# Patient Record
Sex: MALE | Race: WHITE
[De-identification: names, ages, dates, MRNs, and addresses within clinical notes are randomized per-mention and may not be internally consistent; named-entity substitution may affect disease eponyms.]

---

## 2017-01-12 NOTE — P.CRDCN
History of Present Illness


Consult date: 01/12/17


Chief complaint: Chest pain


History of present illness: 





This is a pleasant 51-year-old gentleman with a past medical history 

significant for obesity and no other comorbidities like diabetes or 

hypertension or dyslipidemia resented to the emergency room complaining of 

chest discomfort.


He was experiencing cold symptoms over the last few days where he was having 

some sore throat and cough.  Yesterday he developed the chest discomfort as 

sharp kind of discomfort with radiation to the back and without any associated 

symptoms.


The EKG showed sinus rhythm without any significant ST or T-wave abnormalities.


He had only one set of cardiac enzyme came in to be unremarkable.


The patient is not aware of any prior cardiac history and never seen a 

cardiologist in the past.


He is a smoker but he quit smoking about 2 weeks ago.











Past Medical History


Past Medical History: Renal Disease, Sleep Apnea/CPAP/BIPAP


Additional Past Medical History / Comment(s): 2012 Bell's palsey, 

nephrolithiasis, PETRA with CPAP use, had EGD 1/11/1 and may have gastritis.


History of Any Multi-Drug Resistant Organisms: None Reported


Past Surgical History: No Surgical Hx Reported


Additional Past Surgical History / Comment(s): 1/11/17  EGD with bx.  Other 

surgical Hx:  colonoscopy-normal, several lithotripsies, vasectomy.


Past Anesthesia/Blood Transfusion Reactions: No Reported Reaction, Motion 

Sickness


Past Psychological History: No Psychological Hx Reported


Additional Psychological History / Comment(s): Pt resides with his spouse and 2 

sons one of which is under the age of 18yrs.  He is independent.


Smoking Status: Former smoker


Past Alcohol Use History: Occasional


Additional Past Alcohol Use History / Comment(s): Pt states he started smoking 

in 1984 and was a less than a ppd smoker.  He states he quit smoking 1-2 weeks 

ago.  He drinks alcohol occasionally.


Past Drug Use History: Marijuana


Additional Drug Use History / Comment(s): Pt states he smokes marijuana 

occasionally.





- Past Family History


  ** Father


Additional Family Medical History / Comment(s): Pt states his father has about 

20% heart function.  He is in his late 70's and his heart problems stared about 

20 yrs ago.





  ** Mother


Family Medical History: Diabetes Mellitus





Medications and Allergies


 Home Medications











 Medication  Instructions  Recorded  Confirmed  Type


 


No Known Home Medications [No  01/12/17 01/12/17 History





Known Home Medications]    











 Allergies











Allergy/AdvReac Type Severity Reaction Status Date / Time


 


No Known Allergies Allergy   Verified 01/12/17 07:03














Physical Exam


Vitals: 


 Vital Signs











  Temp Pulse Pulse Resp BP BP Pulse Ox


 


 01/12/17 07:30  97.4 F L   63  18   139/79  98


 


 01/12/17 07:06  97.1 F L  64   16  119/72   98








 Intake and Output











 01/11/17 01/12/17 01/12/17





 22:59 06:59 14:59


 


Other:   


 


  Weight   143.9 kg








 Patient Weight











 01/13/17





 06:59


 


Weight 143.9 kg














- Constitutional


General appearance: no acute distress





- Respiratory


Respiratory: bilateral: CTA





- Cardiovascular


Rhythm: regular


Heart sounds: normal: S1, S2





Results





 01/12/17 04:55





 01/12/17 04:55


 Current Medications











Generic Name Dose Route Start Last Admin





  Trade Name Freq  PRN Reason Stop Dose Admin


 


Aspirin  325 mg  01/13/17 09:00  





  Aspirin  PO   





  DAILY [facility]   


 


Heparin Sodium (Porcine)  0 unit  01/12/17 06:38  





  Heparin  IV   





  Q6HR PRN   





  Low PTT   





  Protocol   


 


Heparin Sodium/Dextrose 25,000  500 mls @ 20.13 mls/hr  01/12/17 06:45  01/12/ 17 07:04





  unit/ IV Solution  IV   7.34 units/kg/hr





  .Q24H MADHURI   20 mls/hr





  Protocol   Administration





  7.4 UNITS/KG/HR   


 


Nitroglycerin  1 inch  01/12/17 12:00  





  Nitro-Bid Oint  TOPICAL   





  Q6HR [facility]   


 


Nitroglycerin  0.4 mg  01/12/17 06:38  





  Nitrostat  SUBLINGUAL   





  Q5M PRN   





  Chest Pain   


 


Sodium Chloride  10 ml  01/12/17 09:00  





  Saline Flush  IV   





  BID MADHURI   








 Intake and Output











 01/11/17 01/12/17 01/12/17





 22:59 06:59 14:59


 


Other:   


 


  Weight   143.9 kg








 Patient Weight











 01/13/17





 06:59


 


Weight 143.9 kg














Assessment and Plan


Plan: 





Assessment


#1 atypical chest discomfort


#2 significant history of smoking





Plan


#1 waiting for the second set of serial cardiac enzymes


#2 an echocardiogram was performed and I'll follow-up with that


#3 further recommendation to follow the

## 2017-01-12 NOTE — XR
EXAMINATION TYPE: XR chest 2V

 

DATE OF EXAM: 1/12/2017 5:01 AM

 

COMPARISON: 8/31/2016

 

HISTORY: Chest pain

 

TECHNIQUE:  Frontal and lateral views of the chest are obtained.

 

FINDINGS:  Heart and mediastinum are normal. Lungs are clear. Diaphragm is normal. There are chest le
ads. Bony thorax is intact.

 

IMPRESSION:  Normal chest. No change.

## 2017-01-12 NOTE — ECHOF
Referral Reason:acs



MEASUREMENTS

--------

HEIGHT: 182.9 cm

WEIGHT: 136.1 kg

BP: 128/71

RVIDd:   3.7 cm     (< 3.3)

IVSd:   1.6 cm     (0.6 - 1.1)

LVIDd:   4.2 cm     (3.9 - 5.3)

LVPWd:   1.4 cm     (0.6 - 1.1)

IVSs:   2.2 cm

LVIDs:   2.8 cm

LVPWs:   1.9 cm

LA Diam:   3.7 cm     (2.7 - 3.8)

Ao Diam:   2.9 cm     (2.0 - 3.7)

AV Cusp:   2.3 cm     (1.5 - 2.6)

LA Diam:   3.5 cm     (2.7 - 3.8)

MV EXCURSION:   13.189 mm     (> 18.000)

MV EF SLOPE:   55 mm/s     (70 - 150)

EPSS:   1.4 cm

MV E Rogers:   0.94 m/s

MV DecT:   224 ms

MV A Rogers:   0.67 m/s

MV E/A Ratio:   1.42 

RAP:   5.00 mmHg

RVSP:   34.31 mmHg







FINDINGS

--------

Sinus rhythm.

This was a technically adequate study.

The left ventricular size is normal.   There is 

moderate concentric left ventricular hypertrophy.   

Overall left ventricular systolic function is normal 

with, an EF between 60 - 65 %.

The right ventricle is mildly enlarged.

The left atrial size is normal.

The right atrium is normal in size.

1.5mg of Definity was utilized for enhancement of images

The aortic valve is trileaflet and appears structurally 

normal.

Mild mitral annular calcification present.

Mild tricuspid regurgitation present.   Right 

ventricular systolic pressure is normal at < 35 mmHg.

Pulmonic valve appears structurally normal.

The aortic root size is normal.

The inferior vena cava is mildly dilated.

There is no pericardial effusion.



CONCLUSIONS

--------

1. Sinus rhythm.

2. Mild mitral annular calcification present.

3. Mild tricuspid regurgitation present.

4. Right ventricular systolic pressure is normal at < 35 

mmHg.

5. Pulmonic valve appears structurally normal.

6. The aortic root size is normal.

7. The inferior vena cava is mildly dilated.

8. There is no pericardial effusion.

9. This was a technically adequate study.

10. The left ventricular size is normal.

11. There is moderate concentric left ventricular 

hypertrophy.

12. Overall left ventricular systolic function is normal 

with, an EF between 60 - 65 %.

13. The right ventricle is mildly enlarged.

14. The left atrial size is normal.

15. 1.5mg of Definity was utilized for enhancement of images

16. The aortic valve is trileaflet and appears structurally 

normal.





SONOGRAPHER: Juan Krishnamurthy RDCS

## 2017-01-12 NOTE — P.HPIM
History of Present Illness


H&P Date: 01/12/17


Chief Complaint: Chest pain





This is a 51-year-old  male patient of Dr. Gee Mckenzie with past 

medical history for kidney stones, obstructive sleep apnea, tobacco use and 

dependence, marijuana use.  He gives history that he has had pains in the left 

upper chest for the past 1-1-1/2 months.  He thought it was due to a cold with 

cold symptoms but his left chest was sore.  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  Then the pain radiated to the 

shoulder blade on the same side.  He states that this morning he was going to 

bed and over entire issues and the pain started again and his wife made him 

come into the hospital.  Patient also gives history of having stomach problems 

since last summer and was initially sent to Dr. Orourke underwent a colonoscopy 

and then underwent an EGD yesterday with Dr. Cuenca at the [facility].  He states he left the procedure and went home and didn't feel good.  

He states he then had the ache in his left chest and shoulder.  He also voices 

that he's had some pain in his neck and jaw area and shooting pain in his left 

arm.  He denies any tenderness to the area.  He came into [facility] emergency center for evaluation.  Troponin negative on 2 draws so far.

  EKG showed a sinus rhythm without ST-T wave changes. He was started on 

heparin drip and placed on the observation unit.  Echocardiogram reveals mild 

tricuspid regurgitation, moderate concentric left ventricular hypertrophy, EF 60

-65%. 





Review of Systems


All systems: negative


Constitutional: Denies chills, Denies fever


Eyes: denies blurred vision, denies pain


Ears, nose, mouth and throat: Denies headache, Denies sore throat


Cardiovascular: Reports chest pain, Denies shortness of breath


Respiratory: Denies cough


Gastrointestinal: Denies abdominal pain, Denies diarrhea, Denies nausea, Denies 

vomiting


Musculoskeletal: Denies myalgias


Integumentary: Denies pruritus, Denies rash


Neurological: Denies numbness, Denies weakness


Psychiatric: Denies anxiety, Denies depression


Endocrine: Denies fatigue, Denies weight change





Past Medical History


Past Medical History: Renal Disease, Sleep Apnea/CPAP/BIPAP


Additional Past Medical History / Comment(s): 2012 Bell's palsey, 

nephrolithiasis, PETRA with CPAP use, had EGD 1/11/1 and may have gastritis.


History of Any Multi-Drug Resistant Organisms: None Reported


Past Surgical History: No Surgical Hx Reported


Additional Past Surgical History / Comment(s): 1/11/17  EGD with bx.  Other 

surgical Hx:  colonoscopy-normal, several lithotripsies, vasectomy.


Past Anesthesia/Blood Transfusion Reactions: No Reported Reaction, Motion 

Sickness


Past Psychological History: No Psychological Hx Reported


Additional Psychological History / Comment(s): Pt resides with his spouse and 2 

sons one of which is under the age of 18yrs.  He is independent.


Smoking Status: Former smoker


Past Alcohol Use History: Occasional


Additional Past Alcohol Use History / Comment(s): Pt states he started smoking 

in 1984 and was a less than a ppd smoker.  He states he quit smoking 1-2 weeks 

ago.  He drinks alcohol occasionally.


Past Drug Use History: Marijuana


Additional Drug Use History / Comment(s): Pt states he smokes marijuana 

occasionally.





- Past Family History


  ** Father


Additional Family Medical History / Comment(s): Pt states his father has about 

20% heart function.  He is in his late 70's and his heart problems stared about 

20 yrs ago.





  ** Mother


Family Medical History: Diabetes Mellitus


Additional Family Medical History / Comment(s): Mother is alive at age 70 with 

history of diabetes.





  ** Brother(s)


Additional Family Medical History / Comment(s): Patient has 2 brothers and 2 

sisters with no major medical problems.





  ** Son(s)


Additional Family Medical History / Comment(s): Patient has 2 sons and 1 

recently diagnosed with chiari malformation.  He does not have any daughters.





Medications and Allergies


 Home Medications











 Medication  Instructions  Recorded  Confirmed  Type


 


No Known Home Medications [No  01/12/17 01/12/17 History





Known Home Medications]    











 Allergies











Allergy/AdvReac Type Severity Reaction Status Date / Time


 


No Known Allergies Allergy   Verified 01/12/17 07:03














Physical Exam


Vitals: 


 Vital Signs











  Temp Pulse Pulse Resp BP BP Pulse Ox


 


 01/12/17 07:30  97.4 F L   63  18   139/79  98


 


 01/12/17 07:06  97.1 F L  64   16  119/72   98








 Intake and Output











 01/11/17 01/12/17 01/12/17





 22:59 06:59 14:59


 


Other:   


 


  Weight   143.9 kg








 Patient Weight











 01/13/17





 06:59


 


Weight 143.9 kg

















Gen: This is a 51-year-old  male.  Patient appears to be in no acute 

distress.


HEENT: Head is atraumatic, normocephalic. Pupils equal, round. Sclerae is 

anicteric. 


NECK: Supple. No JVD. No lymphadenopathy. No thyromegaly. 


LUNGS: Clear to auscultation. No wheezes or rhonchi.  No intercostal 

retractions.


HEART: Regular rate and rhythm. No murmur. 


ABDOMEN: Soft. Bowel sounds are present. No masses.  No tenderness.


EXTREMITIES: No pedal edema.  No calf tenderness.


NEUROLOGICAL: Patient is awake, alert and oriented x3. Cranial nerves 2 through 

12 are grossly intact. 








Results


CBC & Chem 7: 


 01/12/17 04:55





 01/12/17 04:55





Thrombosis Risk Factor Assmnt





- DVT/VTE Prophylaxis


DVT/VTE Prophylaxis: Pharmacologic Prophylaxis ordered





- Choose All That Apply


Any of the Below Risk Factors Present?: Yes


Each Factor Represents 1 point: Age 41-60 years, Obesity (BMI >25)


Other Risk Factors: No


Other congenital or acquired thrombophilia - If yes, enter type in comment: No


Thrombosis Risk Factor Assessment Total Risk Factor Score: 2


Thrombosis Risk Factor Assessment Level: Low Risk





Assessment and Plan


Plan: 





1.  Chest pain, possibly musculoskeletal.  Awaiting third set of cardiac 

enzymes.  Echocardiogram as above.  Patient has been seen by cardiologist and 

is on a heparin drip.





2.  Obstructive sleep apnea on CPAP.  Continue with patient stays overnight.





3.  History of kidney stones, stable.





4.  Tobacco use and dependence.  Smoking cessation.





5.  Regular marijuana use.








Patient placed on the observation unit.


Discharge plan: Return home


Impression and plan of care have been directed as dictated by the signing 

physician.  Sharon Gavin nurse practitioner acting as scribe for signing 

physician.

## 2017-01-13 NOTE — P.PN
Subjective


Principal diagnosis: 





Atypical chest discomfort





This is a pleasant 51-year-old gentleman with a past medical history 

significant for obesity and no other comorbidities like diabetes or 

hypertension or dyslipidemia resented to the emergency room complaining of 

chest discomfort.


He was experiencing cold symptoms over the last few days where he was having 

some sore throat and cough.  Yesterday he developed the chest discomfort as 

sharp kind of discomfort with radiation to the back and without any associated 

symptoms.


The EKG showed sinus rhythm without any significant ST or T-wave abnormalities.


He had only one set of cardiac enzyme came in to be unremarkable.


The patient is not aware of any prior cardiac history and never seen a 

cardiologist in the past.


He is a smoker but he quit smoking about 2 weeks ago.





The patient is going to have a stress test. 





Objective





- Vital Signs


Vital signs: 


 Vital Signs











Temp  97.8 F   01/13/17 07:38


 


Pulse  60   01/13/17 07:38


 


Resp  18   01/13/17 07:38


 


BP  137/88   01/13/17 07:38


 


Pulse Ox  96   01/13/17 07:38








 Intake & Output











 01/12/17 01/13/17 01/13/17





 18:59 06:59 18:59


 


Intake Total  1000 


 


Balance  1000 


 


Weight 143.9 kg  


 


Intake:   


 


  Oral  1000 


 


Other:   


 


  Voiding Method  Toilet 


 


  # Voids  1 














- Labs


CBC & Chem 7: 


 01/13/17 06:18





 01/12/17 04:55


Labs: 


 Abnormal Lab Results - Last 24 Hours (Table)











  01/12/17 01/12/17 01/13/17 Range/Units





  12:01 17:53 06:18 


 


Total Creatine Kinase  43 L  41 L   ()  U/L


 


LDL Cholesterol, Calc    143 H  (0-99)  mg/dL

## 2017-01-17 NOTE — P.DS
Providers


Date of admission: 


01/12/17 06:38





Expected date of discharge: 01/13/17


Attending physician: 


Cecille Galarza





Primary care physician: 


Gee Mckenzie





[facility] Course: 





This is a 51-year-old  male patient of Dr. Gee Mckenzie with past 

medical history for kidney stones, obstructive sleep apnea, tobacco use and 

dependence, marijuana use.  He gives history that he has had pains in the left 

upper chest for the past 1-1-1/2 months.  He thought it was due to a cold with 

cold symptoms but his left chest was sore.  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  Then the pain radiated to the 

shoulder blade on the same side.  He states that this morning he was going to 

bed and over entire issues and the pain started again and his wife made him 

come into the hospital.  Patient also gives history of having stomach problems 

since last summer and was initially sent to Dr. Orourke underwent a colonoscopy 

and then underwent an EGD yesterday with Dr. Cuenca at the [facility].  He states he left the procedure and went home and didn't feel good.  

He states he then had the ache in his left chest and shoulder.  He also voices 

that he's had some pain in his neck and jaw area and shooting pain in his left 

arm.  He denies any tenderness to the area.  He came into [facility] emergency center for evaluation.  Troponin negative on 2 draws so far.

  EKG showed a sinus rhythm without ST-T wave changes. He was started on 

heparin drip and placed on the observation unit.  Echocardiogram reveals mild 

tricuspid regurgitation, moderate concentric left ventricular hypertrophy, EF 60

-65%. 





1/13: Patient underwent stress echocardiogram which was normal.  He was cleared 

by cardiology for discharge home.  Patient was discharged home today in stable 

condition.





Discharge diagnoses:


1.  Chest pain, possibly musculoskeletal.  


2.  Obstructive sleep apnea on CPAP.  


3.  History of kidney stones, stable.


4.  Tobacco use and dependence.  


5.  Regular marijuana use.





Patient placed on the observation unit.


Discharge plan: Return home


Impression and plan of care have been directed as dictated by the signing 

physician.  Sharon Gavin nurse practitioner acting as scribe for signing 

physician.





Cc: Dr. Gee Mckenzie





Patient Condition at Discharge: Good





Plan - Discharge Summary


New Discharge Prescriptions: 


Baclofen 10 mg PO BID #60 tab


Omeprazole 40 mg PO DAILY #30 capsule.


Discharge Medication List





Baclofen 10 mg PO BID #60 tab 01/13/17 [Rx]


Ibuprofen [Motrin] 600 mg PO QID PRN #0 tab 01/13/17 [Rx]


Omeprazole 40 mg PO DAILY #30 capsule. 01/13/17 [Rx]








Follow up Appointment(s)/Referral(s): 


Davis Gerber MD [STAFF PHYSICIAN] - 4 Weeks


Gee Mckenzie DO [Primary Care Provider] - 1-2 days


Patient Instructions/Handouts:  Chest Pain (GEN)


Activity/Diet/Wound Care/Special Instructions: 


low fat/low salt diet


activity as tolerated


Discharge Disposition: HOME SELF-CARE

## 2017-01-19 NOTE — CONS
DATE OF CONSULTATION:  01/19/2017



CONSULTATION/NEW PATIENT EVALUATION



A 51-year-old gentleman who has been re-evaluated in the sleep center 

for obstructive sleep apnea-hypopnea syndrome.  



HISTORY OF PRESENT ILLNESS/SLEEP-WAKE EVALUATION:   Patient had been 

diagnosed with obstructive sleep apnea in 2013 was started on 

treatment with CPAP at the pressure of 12 cm of water. I saw him for 

follow-up visit last time in November 2013. At that time, patient 

demonstrated 100% compliance with treatment. His weight at that time 

was 340 pounds. He lost weight down to 320 pounds over the last 3 

years.  



Patient continued to use his CPAP equipment every night for the whole 

night with the same pressure. No snoring with the machine. He likes 

equipment, does not have any problems with that. He maybe wakes up 

from sleep once. No episodes of nocturia.  



SLEEP SCHEDULE:  His usual sleep schedule from 7 p.m. to 3:40 a.m. and 

that is basically 7 days a week.  



FALLING ASLEEP:  No problem with falling asleep. He has a TV set in 

bedroom.  



DURING SLEEP:  He usually sleeps on the back position. 



DURING THE DAY/WAKE STATE:  [place] Sleepiness Scale is 0. No 

sleepiness than during the day at all.  



PAST MEDICAL HISTORY:  Basically none. 



MEDICATIONS:  None at the present time. 



SOCIAL HISTORY: Positive for smoking for about 30 pack-years; quit 2 

weeks ago. Alcohol consumption occasional.  



FAMILY HISTORY: Hypertension, snoring. 



REVIEW OF SYSTEMS: Basically sometimes restriction of nasal breathing. 

No fevers. No double vision. No recent chest pain. No shortness of 

breath. No abdominal pain. No bleeding episodes. No blood in urine. No 

seizure episodes.  



PHYSICAL EXAMINATION: 

GENERAL: During physical exam, a 51-year-old  gentleman 

without distress.  

VITAL SIGNS: /86, HR 64, RR 16. Height (      ), weight 320, BMI 

43.3. Neck 18-1/4 inches in circumference. Temperature 97.1. Oxygen 

saturation at room air 96%.  

HEENT: PERRLA, EOMI. Evaluation of oropharynx extremely low position 

of soft palate. Restriction of nasal breathing, more on the right 

side.  

NECK: Supple. No JVD. Thyroid is not palpable. 

LUNGS: Clear to percussion and to auscultation. Good air exchange. No 

wheezing or rhonchi.  

HEART: S1, S2 regular. No murmurs, gallops or rubs. 

ABDOMEN: Soft and nontender. Bowel sounds are present. No organomegaly 

appreciated.  

EXTREMITIES: No clubbing or cyanosis. 

CNS: Awake, alert, and oriented x3. Cranial nerves 2 to 7 intact. 

There is no fasciculation or atrophy noted. No focal deficits 

observed.  



IMPRESSION: 

1. Severe obstructive sleep apnea-hypopnea syndrome, clinically on 

control with CPAP at 12 cm of water. No snoring with CPAP.  No daytime 

sleepiness.  

2. Obesity. Patient lost 20 pounds since previous titration in 2013. 

3. History of smoking for 30 pack-years; quit 2 weeks ago. 

4. Restriction of nasal breathing, significant more on the right side. 

According to patient when he used the machine, he started to breathe 

through the nose better.  



PLAN: 

1. Prescription for all necessary CPAP supplies, including new mask, 

tube, filters.  

2. We should get results of reading from the machine in the 

relationship [facility] reading to be sure this is normal.  

3. Continue losing weight. 

4. Sleep hygiene with regular time in bed for at least 8 hours. 

5. No driving if feeling any sleepiness. Presently patient does not 

complain of any sleepiness.  



Thank you very much for allowing me to participate in the management 

of your patient.  



Sincerely,







Arsenio Hurley MD, PhD, FAASM.

Diplomat of American Board of Sleep Medicine,

Sleep Medicine Board by American Board of Medical Specialities

American Board of Internal Medicine

Medical Director of [facility] [place]

## 2017-06-29 NOTE — XR
EXAMINATION TYPE: XR abdomen 2V

 

DATE OF EXAM: 6/29/2017

 

CLINICAL DATA:  51 year-old male chronic abdominal and back pain, PHH

 

COMPARISON:  None

 

FINDINGS:

 

Lung bases are clear. 

 

No evidence for free intraperitoneal air. 

 

No dilated small bowel or air-fluid levels. Scattered air and stool seen throughout the colon extendi
ng distally into the rectum. There is mild overall stool burden. 

 

No suspicious calcifications identified. Some phleboliths in the pelvis.

 

IMPRESSION:

 

No evidence of bowel obstruction or free intraperitoneal air.

## 2017-06-29 NOTE — ED
General Adult HPI





- General


Chief complaint: Back Pain/Injury


Stated complaint: back pain


Time Seen by Provider: 06/29/17 09:08


Source: patient, RN notes reviewed


Mode of arrival: ambulatory


Limitations: no limitations





- History of Present Illness


Initial comments: 





51-year-old male presents to the emergency Department chief complaint of 

chronic abdominal pain and back pain.  Patient states that over the last year 

he has developed this chronic abdominal pain and back pain.  Patient states 

that he was seen his doctor who presents to the ER and they've never found a 

cause.  Patient states he just constantly feels bloated.  Patient states he's 

had diarrhea due to the fact he's been drinking prune juice to help with the 

bloated feeling.  Patient states that it flares up his back when it flares up 

his abdomen.  Patient denies any nausea or vomiting.  Patient states that he 

does not know what is causing this and he is having a hard at home.  Patient 

states he was placed on Pepcid and that did not help.  Patient states he has 

been eating healthy and is unable to lose weight.  Patient states he was 

concerned due to his symptoms were thought that he should be seen. Patient 

denies any recent fever, chills, shortness of breath, chest pain, nausea 

vomiting, numbness or tingling, dysuria or hematuria, constipation, headaches 

or visual changes, or any other current symptoms.





- Related Data


 Previous Rx's











 Medication  Instructions  Recorded


 


Dicyclomine [Bentyl] 10 mg PO TID #20 capsule 06/29/17











 Allergies











Allergy/AdvReac Type Severity Reaction Status Date / Time


 


No Known Allergies Allergy   Verified 06/29/17 09:41














Review of Systems


ROS Statement: 


Those systems with pertinent positive or pertinent negative responses have been 

documented in the HPI.





ROS Other: All systems not noted in ROS Statement are negative.





Past Medical History


Past Medical History: Renal Disease, Sleep Apnea/CPAP/BIPAP


Additional Past Medical History / Comment(s): 2012 Bell's palsey, 

nephrolithiasis, PETRA with CPAP use, had EGD 1/11/1 and may have gastritis.


History of Any Multi-Drug Resistant Organisms: None Reported


Past Surgical History: No Surgical Hx Reported


Additional Past Surgical History / Comment(s): 1/11/17  EGD with bx.  Other 

surgical Hx:  colonoscopy-normal, several lithotripsies, vasectomy.


Past Anesthesia/Blood Transfusion Reactions: No Reported Reaction, Motion 

Sickness


Past Psychological History: No Psychological Hx Reported


Smoking Status: Current every day smoker


Past Alcohol Use History: Occasional


Past Drug Use History: Marijuana





- Past Family History


  ** Father


Additional Family Medical History / Comment(s): Pt states his father has about 

20% heart function.  He is in his late 70's and his heart problems stared about 

20 yrs ago.





  ** Mother


Family Medical History: Diabetes Mellitus


Additional Family Medical History / Comment(s): Mother is alive at age 70 with 

history of diabetes.





  ** Brother(s)


Additional Family Medical History / Comment(s): Patient has 2 brothers and 2 

sisters with no major medical problems.





  ** Son(s)


Additional Family Medical History / Comment(s): Patient has 2 sons and 1 

recently diagnosed with chiari malformation.  He does not have any daughters.





General Exam





- General Exam Comments


Initial Comments: 





General:  The patient is awake and alert, in no distress, and does not appear 

acutely ill. 


Eye:  Pupils are equal, round and reactive to light, extra-ocular movements are 

intact; there is normal conjunctiva bilaterally.  No signs of icterus.  


Ears, nose, mouth and throat:  There are moist mucous membranes and no oral 

lesions. 


Neck:  The neck is supple, there is no tenderness.


Cardiovascular:  There is a regular rate and rhythm. No murmur, rub or gallop 

is appreciated.


Respiratory:  Lungs are clear to auscultation, respirations are non-labored, 

breath sounds are equal.  No wheezes, stridor, rales, or rhonchi.


Gastrointestinal:  Soft, non-distended, non-tender abdomen without masses or 

organomegaly noted. There is no rebound or guarding present.  No CVA 

tenderness. Bowel sounds are unremarkable.


Back:  There is no tenderness to palpation in the midline. There is no obvious 

deformity. No rashes noted. 


Musculoskeletal:  Normal ROM, no tenderness, There is no pedal edema. There is 

no calf tenderness or swelling. Sensation intact. Pulses equal bilaterally 2+.  


Neurological:  CN II-XII intact, There are no obvious motor or sensory 

deficits. Coordination appears grossly intact. Speech is normal.


Skin:  Skin is warm and dry and no rashes or lesions are noted. 


Psychiatric:  Cooperative, appropriate mood & affect, normal judgment.  





Limitations: no limitations





Course


 Vital Signs











  06/29/17 06/29/17





  09:03 09:39


 


Temperature 96.9 F L 


 


Pulse Rate 69 86


 


Respiratory 18 18





Rate  


 


Blood Pressure 176/116 143/89


 


O2 Sat by Pulse 98 99





Oximetry  














Medical Decision Making





- Medical Decision Making





51-year-old male presents emergency Department with a chief complaint of 

chronic abdominal pain.  This time lab work is reviewed and x-rays reviewed.  

This time patient's abdomen continues to be soft and nontender.  At this time 

we discussed close follow-up with GI and he is given information.  Discussed 

return parameters all patient's questions stated that he understood the plan.  

He will be discharged.





- Lab Data


Result diagrams: 


 06/29/17 09:21





 06/29/17 09:21


 Lab Results











  06/29/17 06/29/17 06/29/17 Range/Units





  09:21 09:21 09:26 


 


WBC   6.5   (3.8-10.6)  k/uL


 


RBC   4.78   (4.30-5.90)  m/uL


 


Hgb   15.3   (13.0-17.5)  gm/dL


 


Hct   44.2   (39.0-53.0)  %


 


MCV   92.5   (80.0-100.0)  fL


 


MCH   32.0   (25.0-35.0)  pg


 


MCHC   34.6   (31.0-37.0)  g/dL


 


RDW   13.3   (11.5-15.5)  %


 


Plt Count   198   (150-450)  k/uL


 


Neutrophils %   63   %


 


Lymphocytes %   30   %


 


Monocytes %   4   %


 


Eosinophils %   2   %


 


Basophils %   0   %


 


Neutrophils #   4.1   (1.3-7.7)  k/uL


 


Lymphocytes #   1.9   (1.0-4.8)  k/uL


 


Monocytes #   0.3   (0-1.0)  k/uL


 


Eosinophils #   0.1   (0-0.7)  k/uL


 


Basophils #   0.0   (0-0.2)  k/uL


 


Sodium  142    (137-145)  mmol/L


 


Potassium  4.2    (3.5-5.1)  mmol/L


 


Chloride  108 H    ()  mmol/L


 


Carbon Dioxide  22    (22-30)  mmol/L


 


Anion Gap  12    mmol/L


 


BUN  9    (9-20)  mg/dL


 


Creatinine  0.59 L    (0.66-1.25)  mg/dL


 


Est GFR (MDRD) Af Amer  >60    (>60 ml/min/1.73 sqM)  


 


Est GFR (MDRD) Non-Af  >60    (>60 ml/min/1.73 sqM)  


 


Glucose  100 H    (74-99)  mg/dL


 


Calcium  9.3    (8.4-10.2)  mg/dL


 


Total Bilirubin  0.8    (0.2-1.3)  mg/dL


 


AST  26    (17-59)  U/L


 


ALT  49    (21-72)  U/L


 


Alkaline Phosphatase  84    ()  U/L


 


Total Protein  7.7    (6.3-8.2)  g/dL


 


Albumin  4.4    (3.5-5.0)  g/dL


 


Amylase  37    ()  U/L


 


Lipase  30    ()  U/L


 


TSH  1.560    (0.465-4.680)  mIU/L


 


Free T4  1.38    (0.78-2.19)  ng/dL


 


Urine Color    Yellow  


 


Urine Appearance    Cloudy  (Clear)  


 


Urine pH    8.0  (5.0-8.0)  


 


Ur Specific Gravity    1.015  (1.001-1.035)  


 


Urine Protein    Trace H  (Negative)  


 


Urine Glucose (UA)    Negative  (Negative)  


 


Urine Ketones    Negative  (Negative)  


 


Urine Blood    Negative  (Negative)  


 


Urine Nitrite    Negative  (Negative)  


 


Urine Bilirubin    Negative  (Negative)  


 


Urine Urobilinogen    3.0  (<2.0)  mg/dL


 


Ur Leukocyte Esterase    Negative  (Negative)  


 


Urine WBC    <1  (0-5)  /hpf


 


Amorphous Sediment    Few H  (None)  /hpf


 


Urine Mucus    Rare H  (None)  /hpf














- Radiology Data


Radiology results: report reviewed, image reviewed





Disposition


Clinical Impression: 


 Chronic abdominal pain





Disposition: HOME SELF-CARE


Condition: Stable


Instructions:  Abdominal Pain (ED)


Additional Instructions: 


Please use medication as discussed. Please follow up with family doctor if 

symptoms have not improved over the next two days. Please return to the 

emergency room if your symptoms increase or worsen or for any other concerns. 


Prescriptions: 


Dicyclomine [Bentyl] 10 mg PO TID #20 capsule


Referrals: 


Gee Mckenzie DO [Primary Care Provider] - 1-2 days


Maria Teresa Spaulding MD [STAFF PHYSICIAN] - 1-2 days


Time of Disposition: 10:25

## 2017-09-28 NOTE — ED
Abdominal Pain HPI





- General


Chief Complaint: Abdominal Pain


Stated Complaint: abdominal pain


Time Seen by Provider: 09/28/17 10:31


Source: patient


Mode of arrival: ambulatory


Limitations: no limitations





- History of Present Illness


Initial Comments: 





This is a 52-year-old male with a history of IBS who presents emergency 

department for severe rectal pain.  He states that it is happened twice over 

the last couple of weeks.  He states that it is severe in nature and seems to 

wax and wane.  He states that he was at work today and noticed the pain so he 

decided to come in.  He currently has the pain however it is not as severe as 

previously.  He states that he has been constipated however has not noticed any 

pain with defecation.  He states he actually feels better after he has a bowel 

movement.  No blood in the stool.  No vomiting.  He does admit to some bloating 

however this is chronic for him from his IBS.  He is supposed to be on 

amitriptyline and Bentyl however has not refilled his prescriptions for quite 

some time so has not been taking his medications.  He denies any fevers or 

chills.  No masses in the area.  No other complaints.





- Related Data


 Home Medications











 Medication  Instructions  Recorded  Confirmed


 


L.acidoph,Paracasei, B.lactis 1 cap PO DAILY 09/28/17 09/28/17





[Probiotic]   








 Previous Rx's











 Medication  Instructions  Recorded


 


Amoxicillin/Potassium Clav 1 tab PO Q12HR #14 tab 09/28/17





[Augmentin 875-125 Tablet]  


 


HYDROcodone/APAP 5-325MG [Norco 1 - 2 tab PO Q6HR PRN #8 tab 09/28/17





5-325]  


 


Hydrocortisone [Anusol-Hc] 1 applic RECTAL BID #30 gm 09/28/17











 Allergies











Allergy/AdvReac Type Severity Reaction Status Date / Time


 


No Known Allergies Allergy   Verified 09/28/17 11:03














Review of Systems


ROS Statement: 


Those systems with pertinent positive or pertinent negative responses have been 

documented in the HPI.





ROS Other: All systems not noted in ROS Statement are negative.





Past Medical History


Past Medical History: Renal Disease, Sleep Apnea/CPAP/BIPAP


Additional Past Medical History / Comment(s): 2012 Bell's palsey, 

nephrolithiasis, PETRA with CPAP use, had EGD 1/11/1 and may have gastritis.


History of Any Multi-Drug Resistant Organisms: None Reported


Past Surgical History: No Surgical Hx Reported


Additional Past Surgical History / Comment(s): 1/11/17  EGD with bx.  Other 

surgical Hx:  colonoscopy-normal, several lithotripsies, vasectomy.


Past Anesthesia/Blood Transfusion Reactions: No Reported Reaction, Motion 

Sickness


Past Psychological History: No Psychological Hx Reported


Smoking Status: Current every day smoker


Past Alcohol Use History: Rare


Past Drug Use History: Marijuana





- Past Family History


  ** Father


Additional Family Medical History / Comment(s): Pt states his father has about 

20% heart function.  He is in his late 70's and his heart problems stared about 

20 yrs ago.





  ** Mother


Family Medical History: Diabetes Mellitus


Additional Family Medical History / Comment(s): Mother is alive at age 70 with 

history of diabetes.





  ** Brother(s)


Additional Family Medical History / Comment(s): Patient has 2 brothers and 2 

sisters with no major medical problems.





  ** Son(s)


Additional Family Medical History / Comment(s): Patient has 2 sons and 1 

recently diagnosed with chiari malformation.  He does not have any daughters.





General Exam





- General Exam Comments


Initial Comments: 





Constitutional: Awake alert Appears comfortable


Head: Normocephalic atraumatic 


Eyes: no conjunctival injection No scleral icterus EOMI


Neck: No JVD Supple


Heart: Regular rate rhythm normal S1-S2 no murmurs


Lungs: Clear to auscultation bilaterally No wheezing No rales


Abdomen: Soft nondistended nontender, patient with severe rectal pain on 

examination.  There is no noted areas of induration.  No hemorrhoids were 

palpated.


Extremities: Non edematous DP pulses intact Radial pulses intact


Neuro: A&Ox3 No focal neurologic deficits


Psych: Appropriate mood and affect





Limitations: no limitations





Course


 Vital Signs











  09/28/17





  10:24


 


Temperature 97.2 F L


 


Pulse Rate 72


 


Respiratory 16





Rate 


 


Blood Pressure 131/89


 


O2 Sat by Pulse 95





Oximetry 














Medical Decision Making





- Medical Decision Making





This is a 52-year-old male who presents emergency department for rectal pain.  

He had exquisite tenderness on rectal examination.  CT was performed that did 

show some rectal mucosal edema and perirectal fluid.  This was consistent with 

proctitis.  At this time the etiology of his proctitis is unclear.  The patient 

has had a significant history of bowel symptoms for the last year.  This raises 

the possibility of possible inflammatory bowel disease however he is following 

with Dr. Orourke and this can be worked up further by Dr. Orourke as an outpatient.  

At this time going to send him home on Augmentin, Anusol, and Norco as needed 

for pain.  He can return if he has worsening or changing symptoms.  All 

questions were answered.





- Lab Data


Result diagrams: 


 09/28/17 11:15





 09/28/17 11:15


 Lab Results











  09/28/17 09/28/17 09/28/17 Range/Units





  11:15 11:15 11:42 


 


WBC  6.3    (3.8-10.6)  k/uL


 


RBC  4.59    (4.30-5.90)  m/uL


 


Hgb  14.9    (13.0-17.5)  gm/dL


 


Hct  43.4    (39.0-53.0)  %


 


MCV  94.5    (80.0-100.0)  fL


 


MCH  32.4    (25.0-35.0)  pg


 


MCHC  34.3    (31.0-37.0)  g/dL


 


RDW  12.5    (11.5-15.5)  %


 


Plt Count  192    (150-450)  k/uL


 


Neutrophils %  52    %


 


Lymphocytes %  40    %


 


Monocytes %  4    %


 


Eosinophils %  2    %


 


Basophils %  1    %


 


Neutrophils #  3.3    (1.3-7.7)  k/uL


 


Lymphocytes #  2.5    (1.0-4.8)  k/uL


 


Monocytes #  0.3    (0-1.0)  k/uL


 


Eosinophils #  0.1    (0-0.7)  k/uL


 


Basophils #  0.0    (0-0.2)  k/uL


 


Sodium   142   (137-145)  mmol/L


 


Potassium   4.0   (3.5-5.1)  mmol/L


 


Chloride   109 H   ()  mmol/L


 


Carbon Dioxide   23   (22-30)  mmol/L


 


Anion Gap   10   mmol/L


 


BUN   11   (9-20)  mg/dL


 


Creatinine   0.60 L   (0.66-1.25)  mg/dL


 


Est GFR (MDRD) Af Amer   >60   (>60 ml/min/1.73 sqM)  


 


Est GFR (MDRD) Non-Af   >60   (>60 ml/min/1.73 sqM)  


 


Glucose   98   (74-99)  mg/dL


 


Calcium   9.2   (8.4-10.2)  mg/dL


 


Total Bilirubin   0.6   (0.2-1.3)  mg/dL


 


AST   19   (17-59)  U/L


 


ALT   37   (21-72)  U/L


 


Alkaline Phosphatase   84   ()  U/L


 


Total Protein   7.0   (6.3-8.2)  g/dL


 


Albumin   3.9   (3.5-5.0)  g/dL


 


Urine Color    Yellow  


 


Urine Appearance    Turbid  (Clear)  


 


Urine pH    8.0  (5.0-8.0)  


 


Ur Specific Gravity    1.020  (1.001-1.035)  


 


Urine Protein    Trace H  (Negative)  


 


Urine Glucose (UA)    Negative  (Negative)  


 


Urine Ketones    Negative  (Negative)  


 


Urine Blood    Negative  (Negative)  


 


Urine Nitrite    Negative  (Negative)  


 


Urine Bilirubin    Negative  (Negative)  


 


Urine Urobilinogen    >12.0  (<2.0)  mg/dL


 


Ur Leukocyte Esterase    Negative  (Negative)  


 


Amorphous Sediment    Many H  (None)  /hpf


 


Urine Mucus    Rare H  (None)  /hpf














Disposition


Clinical Impression: 


 Proctitis





Disposition: HOME SELF-CARE


Condition: Stable


Instructions:  Proctitis (ED)


Prescriptions: 


Amoxicillin/Potassium Clav [Augmentin 875-125 Tablet] 1 tab PO Q12HR #14 tab


HYDROcodone/APAP 5-325MG [Norco 5-325] 1 - 2 tab PO Q6HR PRN #8 tab


 PRN Reason: Pain


Hydrocortisone [Anusol-Hc] 1 applic RECTAL BID #30 gm


Referrals: 


Gee Mckenzie DO [Primary Care Provider] - 1-2 days


Maria Teresa Spaulding MD [STAFF PHYSICIAN] - 1-2 days

## 2017-09-28 NOTE — CT
EXAMINATION TYPE: CT abdomen pelvis wo con

 

DATE OF EXAM: 9/28/2017

 

COMPARISON: NONE

 

HISTORY: c/o rectal pain, hx of renal stones

 

CT DLP: 1525.0 mGycm

Automated exposure control for dose reduction was used.

 

TECHNIQUE:  Helical acquisition of images was performed from the lung bases through the pelvis.

 

FINDINGS: 

 

LUNG BASES: No significant abnormality is appreciated. 

 

LIVER/GB: No significant abnormality is appreciated. No evidence of hepatic steatosis. 

 

PANCREAS: No significant abnormality is seen. No ductal dilatation.

 

SPLEEN: Unremarkable in morphology

 

ADRENALS: No significant abnormality is seen.

 

KIDNEYS: No significant abnormality is seen. Right renal artery punctate calcification is seen. No ev
idence of hydronephrosis within either kidney.

 

FREE AIR:  No free air is visualized

 

ADENOPATHY:  None visualized

 

REPRODUCTIVE ORGANS: Prostate gland is heterogenous and enlarged containing a few central zone calcif
ications.

 

URINARY BLADDER:  No significant abnormality is seen.

 

OSSEOUS STRUCTURES:  No significant abnormality is seen.

 

BOWEL:  There is a small amount of perirectal edema although evaluation for perirectal mucosal edema 
is limited due to rectal decompression. Adjacent fascial plane thickening is seen. No focal fluid col
lection to suggest perirectal abscess is identified. No sinus tract is seen.

 

Scattered sigmoid diverticula are present without pericolonic fat stranding. No evidence of bowel obs
truction. Appendix is within normal limits of size.

 

IMPRESSION: 

 

1. SMALL AMOUNT OF CIRCUMFERENTIAL PERIRECTAL FLUID, SUGGESTIVE OF PROCTITIS WITH NO FOCAL MEASURABLE
 FLUID COLLECTION TO SUGGEST PERIRECTAL ABSCESS. NO SINUS TRACT.

2. SIGMOID DIVERTICULOSIS WITHOUT EVIDENCE OF ACUTE DIVERTICULITIS.

3. HETEROGENOUS ENLARGED PROSTATE GLAND.

4. NO EVIDENCE OF NEPHROLITHIASIS OR OBSTRUCTIVE UROPATHY.

## 2017-10-03 ENCOUNTER — HOSPITAL ENCOUNTER (EMERGENCY)
Dept: HOSPITAL 47 - EC | Age: 52
Discharge: HOME | End: 2017-10-03
Payer: COMMERCIAL

## 2017-10-03 VITALS — HEART RATE: 66 BPM | DIASTOLIC BLOOD PRESSURE: 104 MMHG | SYSTOLIC BLOOD PRESSURE: 165 MMHG | TEMPERATURE: 97.6 F

## 2017-10-03 VITALS — RESPIRATION RATE: 20 BRPM

## 2017-10-03 DIAGNOSIS — R19.7: ICD-10-CM

## 2017-10-03 DIAGNOSIS — K62.89: Primary | ICD-10-CM

## 2017-10-03 DIAGNOSIS — Z79.899: ICD-10-CM

## 2017-10-03 DIAGNOSIS — F17.200: ICD-10-CM

## 2017-10-03 LAB
ANION GAP SERPL CALC-SCNC: 10 MMOL/L
BASOPHILS # BLD AUTO: 0 K/UL (ref 0–0.2)
BASOPHILS NFR BLD AUTO: 1 %
BUN SERPL-SCNC: 12 MG/DL (ref 9–20)
CALCIUM SPEC-MCNC: 9 MG/DL (ref 8.4–10.2)
CH: 31.9
CHCM: 33.6
CHLORIDE SERPL-SCNC: 110 MMOL/L (ref 98–107)
CO2 SERPL-SCNC: 22 MMOL/L (ref 22–30)
EOSINOPHIL # BLD AUTO: 0.2 K/UL (ref 0–0.7)
EOSINOPHIL NFR BLD AUTO: 3 %
ERYTHROCYTE [DISTWIDTH] IN BLOOD BY AUTOMATED COUNT: 4.58 M/UL (ref 4.3–5.9)
ERYTHROCYTE [DISTWIDTH] IN BLOOD: 12.7 % (ref 11.5–15.5)
GLUCOSE SERPL-MCNC: 106 MG/DL (ref 74–99)
HCT VFR BLD AUTO: 43.7 % (ref 39–53)
HDW: 2.43
HGB BLD-MCNC: 14.7 GM/DL (ref 13–17.5)
LUC NFR BLD AUTO: 2 %
LYMPHOCYTES # SPEC AUTO: 1.8 K/UL (ref 1–4.8)
LYMPHOCYTES NFR SPEC AUTO: 33 %
MCH RBC QN AUTO: 32.1 PG (ref 25–35)
MCHC RBC AUTO-ENTMCNC: 33.6 G/DL (ref 31–37)
MCV RBC AUTO: 95.4 FL (ref 80–100)
MONOCYTES # BLD AUTO: 0.2 K/UL (ref 0–1)
MONOCYTES NFR BLD AUTO: 4 %
NEUTROPHILS # BLD AUTO: 3 K/UL (ref 1.3–7.7)
NEUTROPHILS NFR BLD AUTO: 57 %
NON-AFRICAN AMERICAN GFR(MDRD): >60
PH UR: 7 [PH] (ref 5–8)
POTASSIUM SERPL-SCNC: 4.2 MMOL/L (ref 3.5–5.1)
SODIUM SERPL-SCNC: 142 MMOL/L (ref 137–145)
SP GR UR: 1.01 (ref 1–1.03)
UA BILLING (MACRO VS. MICRO): (no result)
UROBILINOGEN UR QL STRIP: 3 MG/DL (ref ?–2)
WBC # BLD AUTO: 0.1 10*3/UL
WBC # BLD AUTO: 5.3 K/UL (ref 3.8–10.6)
WBC (PEROX): 5.23

## 2017-10-03 PROCEDURE — 51798 US URINE CAPACITY MEASURE: CPT

## 2017-10-03 PROCEDURE — 81003 URINALYSIS AUTO W/O SCOPE: CPT

## 2017-10-03 PROCEDURE — 96361 HYDRATE IV INFUSION ADD-ON: CPT

## 2017-10-03 PROCEDURE — 85025 COMPLETE CBC W/AUTO DIFF WBC: CPT

## 2017-10-03 PROCEDURE — 99284 EMERGENCY DEPT VISIT MOD MDM: CPT

## 2017-10-03 PROCEDURE — 36415 COLL VENOUS BLD VENIPUNCTURE: CPT

## 2017-10-03 PROCEDURE — 96374 THER/PROPH/DIAG INJ IV PUSH: CPT

## 2017-10-03 PROCEDURE — 80048 BASIC METABOLIC PNL TOTAL CA: CPT

## 2017-10-03 PROCEDURE — 96375 TX/PRO/DX INJ NEW DRUG ADDON: CPT

## 2017-10-03 NOTE — ED
General Adult HPI





- General


Chief complaint: Urogenital


Stated complaint: Urogenital


Time Seen by Provider: 10/03/17 12:08


Source: patient, family (Wife)


Mode of arrival: ambulatory


Limitations: no limitations





- History of Present Illness


Initial comments: 





Patient presents with intermittent rectal pain.  States this occurred over the 

past 6 months, however is becoming more frequent and more severe.  Patient 

describes sharp shooting pains in his rectum.  State they're intermittent 

throughout the night last night.  Patient was seen in the ER for similar 

episodes one week ago, was discharged home with Anusol cream, amoxicillin, 

[place].  Patient's lab work and UA at that time showed no signs of infection.  

Patient states she has a history of IBS, had a colonoscopy 6 months ago by Dr. Orourke, showed one small spot of inflammation per wife.  Patient states he has 

always had intermittent diarrhea and constipation.  Does not take any stool 

softeners.  Patient states he is tried drinking prune juice to improve his 

stools.  Patient denies urinary symptoms, hematuria, blood in his stools.  

Denies history of hemorrhoids.  Patient states he had a large normal bowel 

movement this morning.  Did not have any pain during the bowel movement.  

Patient states the sharpshooting rectal pains come on randomly.  Patient does 

note that they appeared to occur more frequently and worse after sitting for 

long periods in his [employer] at work.  He has not tried any seat cushions while 

sitting.  Denies fevers, chills, nausea, vomiting, penile pain, testicular pain

, skin changes.


MD Complaint: Rectal pain


Quality: sharp


Consistency: intermittent





- Related Data


 Home Medications











 Medication  Instructions  Recorded  Confirmed


 


L.acidoph,Paracasei, B.lactis 1 cap PO DAILY 09/28/17 10/03/17





[Probiotic]   








 Previous Rx's











 Medication  Instructions  Recorded


 


Amoxicillin/Potassium Clav 1 tab PO Q12HR #14 tab 09/28/17





[Augmentin 875-125 Tablet]  


 


HYDROcodone/APAP 5-325MG [Norco 1 - 2 tab PO Q6HR PRN #8 tab 09/28/17





5-325]  


 


Hydrocortisone [Anusol-Hc] 1 applic RECTAL BID #30 gm 09/28/17


 


Docusate Oral Soln [Colace Oral 100 mg PO BID #500 ml 10/03/17





Soln]  











 Allergies











Allergy/AdvReac Type Severity Reaction Status Date / Time


 


No Known Allergies Allergy   Verified 10/03/17 12:22














Review of Systems


ROS Statement: 


Those systems with pertinent positive or pertinent negative responses have been 

documented in the HPI.





ROS Other: All systems not noted in ROS Statement are negative.


Constitutional: Denies: fever, chills, weakness


Eyes: Denies: vision change


ENT: Denies: throat pain, congestion


Respiratory: Denies: cough, dyspnea


Cardiovascular: Denies: chest pain, palpitations


Endocrine: Denies: fatigue


Gastrointestinal: Reports: diarrhea, constipation, other (Rectal pain).  Denies

: abdominal pain, nausea, vomiting, hematemesis, melena, hematochezia


Genitourinary: Denies: urgency, dysuria, frequency, hematuria, discharge, 

testicular pain, testicular mass


Musculoskeletal: Denies: back pain


Skin: Denies: rash, lesions, pruritus


Neurological: Denies: headache, weakness, numbness, paresthesias





Past Medical History


Past Medical History: Renal Disease, Sleep Apnea/CPAP/BIPAP


Additional Past Medical History / Comment(s): 2012 Bell's palsey, 

nephrolithiasis, PETRA with CPAP use, had EGD 1/11/1 and may have gastritis.


History of Any Multi-Drug Resistant Organisms: None Reported


Past Surgical History: No Surgical Hx Reported


Additional Past Surgical History / Comment(s): 1/11/17  EGD with bx.  Other 

surgical Hx:  colonoscopy-normal, several lithotripsies, vasectomy.


Past Anesthesia/Blood Transfusion Reactions: No Reported Reaction, Motion 

Sickness


Past Psychological History: No Psychological Hx Reported


Smoking Status: Current every day smoker


Past Alcohol Use History: Rare


Past Drug Use History: Marijuana





- Past Family History


  ** Father


Additional Family Medical History / Comment(s): Pt states his father has about 

20% heart function.  He is in his late 70's and his heart problems stared about 

20 yrs ago.





  ** Mother


Family Medical History: Diabetes Mellitus


Additional Family Medical History / Comment(s): Mother is alive at age 70 with 

history of diabetes.





  ** Brother(s)


Additional Family Medical History / Comment(s): Patient has 2 brothers and 2 

sisters with no major medical problems.





  ** Son(s)


Additional Family Medical History / Comment(s): Patient has 2 sons and 1 

recently diagnosed with chiari malformation.  He does not have any daughters.





General Exam





- General Exam Comments


Initial Comments: 





Patient laying on bed, appears in mild to moderate pain.  Not ill appearing.  

Well-groomed well-dressed.


Limitations: no limitations


General appearance: alert


Head exam: Present: atraumatic, normocephalic


Eye exam: Present: normal appearance


ENT exam: Present: normal exam


Neck exam: Present: normal inspection


Respiratory exam: Present: normal lung sounds bilaterally.  Absent: respiratory 

distress, wheezes, rales


Cardiovascular Exam: Present: regular rate, normal rhythm


GI/Abdominal exam: Present: soft.  Absent: distended, tenderness, guarding, 

rebound, rigid


Rectal exam: Present: normal inspection, normal rectal tone, heme (-) stool, 

tenderness, other (Prostate not palpated.  No anal fissures or abscesses 

visualized.  No external rectal abnormalities visualized.).  Absent: black stool

, hemorrhoids, mass


Neurological exam: Present: alert, oriented X3


Psychiatric exam: Present: normal affect, normal mood


Skin exam: Present: warm, dry, intact





Course


 Vital Signs











  10/03/17 10/03/17





  12:04 14:45


 


Pulse Rate 103 H 68


 


Respiratory 18 20





Rate  


 


Blood Pressure 188/131 135/75


 


O2 Sat by Pulse 97 





Oximetry  














Medical Decision Making





- Medical Decision Making





Lengthy discussion with patient and wife regarding his symptoms.  Patient's 

symptoms have been intermittent over 6 months.  Patient was seen and evaluated 

in the ER for the same symptoms one week ago and did not have any signs of 

infection.  Patient has been on antibiotics, Norco, Anusol outpatient.  Patient 

states he has gotten some relief from Anusol, however he did not use any of it 

today.  Patient states he has an appointment on Friday with his 

gastroenterologist Dr. Orourke.  At this time and do not feel patient has 

perirectal abscess, anal fissure, hemorrhoids, prostatitis.  Patient's symptoms 

may represent rectal muscle spasm such as proctalgia fugax.  She likely needs a 

repeat colonoscopy with his gastroenterologist ulcerative colitis or Crohn's 

disease or other inflammatory.  Patient intermittent diarrhea and constipation 

may be aggravating internal mucosa or causing muscle spasm.  I discussed high-

fiber diet, possible symptomatic control with nitroglycerin or diltiazem 

ointment to the rectum.  





Patient and wife would like to repeat blood work and urinalysis at this time in 

the ER.  Will give dose of morphine for pain.





Spoke with Dr. Serrano, covering for Dr. Orourke, updated with patient condition and 

results thus far.  He agrees patient's symptoms may be secondary to rectal 

spasms, he recommends follow-up white blood cell count, if elevated it CT of 

abdomen and pelvis.  If not patient okay for follow-up at his appointment on 

Friday.  He recommends stool softener Colace to prevent constipation from 

opiate pain medications.  Agrees with high-fiber diet.





15:31  Vision unable to provide urine sample thus far.  Patient given 1 L IV 

fluids, patient has 64 mL done bladder scan.  Will continue with IV hydration.  

Await urine result.





15:01 white blood cell count 5.  Urine shows no sign of infection.  Patient 

states pain resolved following second dose of pain medication, he is very 

grateful.  Lengthy discussion with patient and wife regarding liquid diet, 

avoid meats, avoid opiate pain medications that will cause constipation when 

possible.  Will give prescription of Colace stool softener.  Use MiraLAX as 

needed for constipation.  Use foam pads while sitting.  Prescription for 

diltiazem ointemnt given.  Patient and wife understand need to limit straining 

during bowel movements, has symptoms may be secondary to muscle spasms and 

constipation episodes.  They understand need to follow up at GI appointment on 

Friday to schedule likely colonoscopy.  They feel comfortable going home at 

this time.  All questions answered.  Will discharge home. 





- Lab Data


Result diagrams: 


 10/03/17 13:10





 10/03/17 13:10


 Lab Results











  10/03/17 10/03/17 10/03/17 Range/Units





  13:10 13:10 16:39 


 


WBC   5.3   (3.8-10.6)  k/uL


 


RBC   4.58   (4.30-5.90)  m/uL


 


Hgb   14.7   (13.0-17.5)  gm/dL


 


Hct   43.7   (39.0-53.0)  %


 


MCV   95.4   (80.0-100.0)  fL


 


MCH   32.1   (25.0-35.0)  pg


 


MCHC   33.6   (31.0-37.0)  g/dL


 


RDW   12.7   (11.5-15.5)  %


 


Plt Count   201   (150-450)  k/uL


 


Neutrophils %   57   %


 


Lymphocytes %   33   %


 


Monocytes %   4   %


 


Eosinophils %   3   %


 


Basophils %   1   %


 


Neutrophils #   3.0   (1.3-7.7)  k/uL


 


Lymphocytes #   1.8   (1.0-4.8)  k/uL


 


Monocytes #   0.2   (0-1.0)  k/uL


 


Eosinophils #   0.2   (0-0.7)  k/uL


 


Basophils #   0.0   (0-0.2)  k/uL


 


Sodium  142    (137-145)  mmol/L


 


Potassium  4.2    (3.5-5.1)  mmol/L


 


Chloride  110 H    ()  mmol/L


 


Carbon Dioxide  22    (22-30)  mmol/L


 


Anion Gap  10    mmol/L


 


BUN  12    (9-20)  mg/dL


 


Creatinine  0.68    (0.66-1.25)  mg/dL


 


Est GFR (MDRD) Af Amer  >60    (>60 ml/min/1.73 sqM)  


 


Est GFR (MDRD) Non-Af  >60    (>60 ml/min/1.73 sqM)  


 


Glucose  106 H    (74-99)  mg/dL


 


Calcium  9.0    (8.4-10.2)  mg/dL


 


Urine Color    Yellow  


 


Urine Appearance    Clear  (Clear)  


 


Urine pH    7.0  (5.0-8.0)  


 


Ur Specific Gravity    1.015  (1.001-1.035)  


 


Urine Protein    Trace H  (Negative)  


 


Urine Glucose (UA)    Negative  (Negative)  


 


Urine Ketones    Negative  (Negative)  


 


Urine Blood    Negative  (Negative)  


 


Urine Nitrite    Negative  (Negative)  


 


Urine Bilirubin    Negative  (Negative)  


 


Urine Urobilinogen    3.0  (<2.0)  mg/dL


 


Ur Leukocyte Esterase    Negative  (Negative)  














Disposition


Clinical Impression: 


 Rectal pain





Disposition: HOME SELF-CARE


Condition: Good


Instructions:  Abdominal Pain (ED)


Additional Instructions: 


Use memory foam pad or dognut pad while sitting, avoid prolonged periods of 

sitting. Follow up at your GI appointment on Friday for colonoscopy. Return to 

ED immediately if fevers, chills, nausea, vomiting, blood in stools, dysuria, 

hematuria. 


Prescriptions: 


Docusate Oral Soln [Colace Oral Soln] 100 mg PO BID #500 ml


Referrals: 


Gee Mckenzie DO [Primary Care Provider] - 1-2 days

## 2018-03-22 ENCOUNTER — HOSPITAL ENCOUNTER (OUTPATIENT)
Dept: HOSPITAL 47 - SLEEP | Age: 53
Discharge: HOME | End: 2018-03-22
Attending: INTERNAL MEDICINE
Payer: COMMERCIAL

## 2018-03-22 DIAGNOSIS — E66.9: ICD-10-CM

## 2018-03-22 DIAGNOSIS — G47.33: Primary | ICD-10-CM

## 2018-03-22 DIAGNOSIS — Z99.89: ICD-10-CM

## 2018-03-22 DIAGNOSIS — K58.9: ICD-10-CM

## 2018-03-22 DIAGNOSIS — J98.8: ICD-10-CM

## 2018-03-22 NOTE — PN
PROGRESS NOTE



DATE OF SERVICE:

03/22/2018



This patient is a 52-year-old gentleman who has been followed in the sleep center for

treatment of obstructive sleep apnea-hypopnea syndrome. Patient is successfully

continuing to use his CPAP equipment every night for the whole night. According to his

wife, he does not snore with the machine.  Patient reported that recently his machine

has had some problems.



[place] Sleepiness Scale increased to 11.



MEDICATIONS:

Patient takes medication for irritable bowel syndrome; he does not remember the name at

the present time. No medications for blood pressure at present.



PHYSICAL EXAMINATION:

GENERAL  A pleasant  gentleman without  distress.

VITAL SIGNS: /73, , RR 16, height 6 feet 0 inches, weight 311, BMI 42.3,

temperature 98.3, oxygen saturation at room air 95%.

HEENT: PERRLA, EOMI. Evaluation of oropharynx showed tongue protrudes midline;

extremely low position of soft palate.

NECK: Supple. No JVD.  Thyroid is not palpable.

LUNGS: Clear to percussion and to auscultation.  Good air exchange.  No wheezing or

rhonchi.

HEART: S1, S2 regular.  No murmurs, gallops or rubs.

ABDOMEN: Obese. EXTREMITIES : No clubbing or cyanosis.

CNS: Awake, alert, and oriented X3.  Cranial nerves 2 to 7 intact.  There is no

fasciculation or atrophy. noted.  No focal deficits observed.



IMPRESSION:

1. Obstructive sleep apnea-hypopnea syndrome.  Patient has continued to use his CPAP

    equipment every night but recently developed some problems related to his CPAP

    unit.  No snoring on CPAP, benefitting from treatment.

2. Obesity. Patient has lost 9 pounds since previous visit.

3. Some restriction of nasal breathing.

4. Irritable bowel syndrome.



PLAN:

1. Continue treatment with CPAP every night for the whole night.

2. Prescription for all necessary CPAP supplies, including mask, tube, filters.

3. Prescription for checking and replacement of CPAP unit if necessary.

4. Losing weight.

5. No driving if feeling any sleepiness.

Thank you very much for allowing me to participate in the management of your patient.



Sincerely,







Arsenio Hurley MD, PhD, FAASM

Diplomat of American Board of Medical Specialties

American Board of Internal Medicine

Medical Director of [facility] [place]





MMODL / IJN: [id number] / Job#: [id number]

## 2019-11-14 ENCOUNTER — HOSPITAL ENCOUNTER (OUTPATIENT)
Dept: HOSPITAL 47 - SLEEP | Age: 54
Discharge: HOME | End: 2019-11-14
Attending: INTERNAL MEDICINE
Payer: COMMERCIAL

## 2019-11-14 DIAGNOSIS — Z99.89: ICD-10-CM

## 2019-11-14 DIAGNOSIS — E66.9: ICD-10-CM

## 2019-11-14 DIAGNOSIS — Z87.19: ICD-10-CM

## 2019-11-14 DIAGNOSIS — G47.33: Primary | ICD-10-CM

## 2019-11-14 NOTE — PN
PROGRESS NOTE



DATE OF SERVICE:

11/14/2019



This patient is a 54-year-old gentleman has been followed in Sleep Center for treatment

of obstructive sleep apnea-hypopnea syndrome.  Patient continues to use his CPAP

equipment every night for the whole night.  He recently started to have some problems

with his CPAP unit.  Part of the wall of the CPAP unit is broken.



I checked his CPAP unit.  CPAP pressure is 12 cm of water. Usage is 30/30 nights for

more than 4 hours with average usage 7 hours per night.  RAMP started at 6. The machine

does not have information about apnea-hypopnea index.



[place] Sleepiness Scale today is 3.



MEDICATIONS:

None at the present time.



PHYSICAL EXAMINATION:

GENERAL: A pleasant patient in no distress.

VITAL SIGNS: /84, HR 68, RR 16, height 6 feet 0 inches, weight 312 pounds, body

mass index 42.5, temperature 97.4, oxygen saturation at room air 97%.

HEENT: PERRLA, EOMI. Evaluation of oropharynx showed tongue protrudes midline.

Extremely low position of soft palate. Mallampati IV.

NECK: Supple. No JVD.  Thyroid is not palpable.

LUNGS: Clear to percussion and to auscultation.  Good air exchange.  No wheezing or

rhonchi.

HEART: S1, S2 regular.  No murmurs, gallops or rubs.

ABDOMEN: Obese.

EXTREMITIES:  No clubbing or cyanosis.

CNS: Awake, alert, and oriented X3.  Cranial nerves 2 to 7 intact.  There is no

fasciculation or atrophy. noted.  No focal deficits observed.



IMPRESSION:

1. Obstructive sleep apnea-hypopnea syndrome.  Patient demonstrated 100% compliance

    with treatment, benefitting from treatment. CPAP unit is old. One wall of the CPAP

    unit is partially broken.

2. Obesity.

3. History of irritable bowel syndrome.

4. Some restriction of nasal breathing.



PLAN:

1. Prescription to replace CPAP unit with CPAP pressure of 12 cm of water.  RAMP

    started from 6.

2. Losing weight.

3. Sleep hygiene with regular time in bed for 7-1/2 to 8 hours.

4. Precautions related to driving.

5. Follow-up visit after patient gets his new CPAP unit to evaluate clinical response

    on treatment and to check apnea-hypopnea index from the machine.

Thank you very much for allowing me to participate in the management of your patient.



Sincerely,







Arsenio Hurley MD, PhD, FAASM

Diplomat of American Board of Medical Specialties

American Board of Internal Medicine

Medical Director of [facility] [place]





MMAMANDA / THEE: [id number] / Job#: [id number]

## 2019-12-01 NOTE — ECHOS
DATE OF SERVICE:  01/13/2017



AGE:   51Y        SEX:  M        HT:  72      WT:  317 lbs.           

Protocol Silver: X Others: Stress Echo Stage: III Dur. of Exercise: 7 

minutes 



*Heart Rate         Blood Pressure                     

*Rest:  70                    Rest:  159/62                           

*                              

*Max. Achieved:     155  Maximum BP:  193/85 

       85% PMHR:  143                    

          100% PMHR:  168          



*METS:  8 





INDICATIONS:  Chest pain.



MEDICATIONS: 



CLINICAL INFORMATION: Chest pain, family history of coronary artery 

disease, history of smoking for 30 years, quit smoking a long time 

ago.  



Resting ECG shows sinus rhythm, rate of 70 beats per minute, IA 

interval of 0.16, QRS 0.08, poor R wave progression in anteroseptal 

leads. Utilizing a standard Silver protocol, a symptom-limited 

treadmill test was performed. Patient exercised for total of 7 

minutes, attained a peak heart rate of 155 beats per minute, which is 

approximately (      )% predicted maximum heart rate without any chest 

pain or pressure or ST segment deviations indicative of ischemia on 

any of the monitoring 12 leads.  



Baseline images show normal thickening and contractility. Postexercise 

images show improved contractility and thickening consistent with 

normal stress echocardiogram.  



CARDIOLOGIST IMPRESSION: 

1. Normal stress echocardiogram with 92% predicted maximum heart rate. 

2. Patient did not report any symptoms throughout the study. 

3. Patient has below average level of cardiopulmonary fitness as 

indicated by VO2 max and METs.  

Patient attained peak metabolic activity equivalent to 8 METs.
None

## 2021-09-22 ENCOUNTER — HOSPITAL ENCOUNTER (OUTPATIENT)
Dept: HOSPITAL 47 - SLEEP | Age: 56
Discharge: HOME | End: 2021-09-22
Attending: INTERNAL MEDICINE
Payer: COMMERCIAL

## 2021-09-22 DIAGNOSIS — Z86.16: ICD-10-CM

## 2021-09-22 DIAGNOSIS — G47.33: Primary | ICD-10-CM

## 2021-09-22 NOTE — SFUN
SLEEP CENTER FOLLOW UP NOTE



DATE OF SERVICE:

09/22/2021



56-year-old gentleman has been followed in Sleep Center for treatment of obstructive

sleep apnea-hypopnea syndrome.



Last time I saw patient about 1-1/2 years ago.  The patient continues to use his CPAP

equipment every night, getting his supplies in time.  [place] Sleepiness Scale today is

2.



I checked his CPAP unit.  Pressure is 12 cm of water.  Usage is 29/30 nights more than

4 hours, average 7.5 hours per night. Leak is 46 L/minute which is high.  Apnea-

hypopnea index at the same time is perfect only 1.3.



MEDICATIONS:

None at the present time.



PHYSICAL EXAMINATION:

GENERAL: Patient in no distress.  /92, HR 73, RR 16, height 6 feet 0 inches,

weight 327.2, which is only 15 pounds more than during the previous visit.  Body mass

index 44.3, temperature 97.9, oxygen saturation at room air 97%.

Oropharynx extremely low position of soft palate, Mallampati IV.

NECK:  Supple, no JVD.  Thyroid is not palpable.

LUNGS:  Clear to percussion and to auscultation.  Good air exchange.  No wheezing or

rhonchi.

HEART:  S1, S2 regular.  No murmurs, gallops, or rubs.

ABDOMEN:  Obese. Soft and nontender.  Bowel sounds are present.  No organomegaly

appreciated.

EXTREMITIES: No clubbing or cyanosis.

CNS:  Awake, alert, and oriented X3.  Cranial nerves 2 to 7 intact.  There is no

fasciculation or atrophy. noted.  No focal deficits observed.



IMPRESSION:

1. Obstructive sleep apnea-hypopnea syndrome.  Patient demonstrated great compliance

    with treatment benefitting from treatment.

2. Normal respiration on CPAP.

3. History of irritable bowel syndrome.

4. Restriction of nasal breathing.



PLAN:

1. Patient will continue to use PAP equipment every night for the whole night.

2. Sleep hygiene with regular time in bed for at least 7-1/2 to 8 hours.

3. Precautions related to driving. No driving if feeling sleepiness.

4. I will maintain all necessary prescription for PAP supplies including mask, tube,

    filters.

5. Watching weight.

6. Follow-up visit in 6 months or earlier if patient has any problems.



I spent with the patient and documentation 30 minutes.



Thank you very much for allowing me to participate in management of your patient.



Sincerely,









Arsenio Hurley MD, PhD, FAASM

Diplomat of American Board of Medical Specialties

Sleep Medicine Board of American Board of Internal Medicine

Medical Director of [facility] [place]





MELIZA / THEE: [id number] / Job#: [id number]

## 2023-04-20 ENCOUNTER — HOSPITAL ENCOUNTER (OUTPATIENT)
Dept: HOSPITAL 47 - SLEEP | Age: 58
End: 2023-04-20
Payer: COMMERCIAL

## 2023-04-20 DIAGNOSIS — F17.200: ICD-10-CM

## 2023-04-20 DIAGNOSIS — K58.9: ICD-10-CM

## 2023-04-20 DIAGNOSIS — E66.9: ICD-10-CM

## 2023-04-20 DIAGNOSIS — G47.33: Primary | ICD-10-CM

## 2023-04-20 DIAGNOSIS — Z99.89: ICD-10-CM

## 2023-04-20 PROCEDURE — 99212 OFFICE O/P EST SF 10 MIN: CPT

## 2023-04-20 NOTE — P.PN
Subjective


DATE: 04/20/2023





FOLLOW UP VISIT.





Patient with obstructive sleep apnea hypopnea syndrome return to sleep center 

for follow-up visit. 


Information from previous visit have been reviewed.  


 Patient is using PAP equipment every night for the whole night, getting PAP 

supplies in time.


The patient does not have significant problems with the mask, PAP unit and 

humidification. [place] sleepiness scale is 0, which is perfect.


I checked information from PAP unit. 


PAP unit pressure 12 cm H2O. 


Usage is 100 % for more then 4 hours, average 7.5 hours per night. 


Leak is high 59.4 l/m. 


Apnea Hypopnea Index is 1.3, which is absolutely normal.  





MEDICATIONS: None


During physical exam:


GENERAL: A pleasant patient without any distress. 


VITAL SIGNS: /77, HR 68, RR 16 , weight 319, temperature 97.8, oxygen 

saturation at room air 96 % .


HEENT: PERRLA, EOMI.low position of soft palate, Mallapati 4 .


NECK: Supple. No JVD. 


LUNGS: Clear to percussion and to auscultation. Good air exchange. No wheezing 

or rhonchi. 


HEART: S1, S2 regular. 


ABDOMEN: Soft and nontender.  Obese  


EXTREMITIES: No clubbing or cyanosis. 


CNS: Awake, alert, and oriented x3.  No focal deficit. 





Impressions:


1.  Obstructive sleep apnea-hypopnea syndrome. Patient demonstrated great 

compliance with treatment, benefiting from treatment.


2.  Obesity, body mass index 42.6, patient lost 8 pounds since previous visit.


3.  History of irritable bowel syndrome.


4.  Restriction of nasal breathing.








Plan:


1.  Continue using PAP equipment every night for the whole night.


2.  To change air filter at least 1-2 times per month.


3.  PAP unit should stay lower then position of the head.


4.  Advised patient to remove all remaining water from humidifier canister daily

and make it dry after each usage. Refill canister with fresh distilled water 

before each usage. 


5.  Sleep hygiene with regular time in bed for at least 8 hours.


6.  Precautions related to driving. No driving if feel any sleepiness.


7.  I will maintain prescription for PAP supplies including mask, tube, filters.


8. Watching and losing weight.


9. Follow up visit in 6 months or earlier if patient has any problems.








Thank you very much for allowing me to participate in the management of your 

patient.








Arsenio Hurley MD, PhD, FAASM.


Diplomat of American Board of Sleep Medicine,


Sleep Medicine Board by American Board of Internal Medicine


Medical Director of [facility] [place]